# Patient Record
Sex: MALE | Race: OTHER | HISPANIC OR LATINO | URBAN - METROPOLITAN AREA
[De-identification: names, ages, dates, MRNs, and addresses within clinical notes are randomized per-mention and may not be internally consistent; named-entity substitution may affect disease eponyms.]

---

## 2022-11-29 ENCOUNTER — EMERGENCY (EMERGENCY)
Facility: HOSPITAL | Age: 37
LOS: 1 days | Discharge: HOME | End: 2022-11-29
Admitting: EMERGENCY MEDICINE

## 2022-11-29 VITALS
HEART RATE: 84 BPM | HEIGHT: 66 IN | DIASTOLIC BLOOD PRESSURE: 95 MMHG | OXYGEN SATURATION: 98 % | WEIGHT: 229.94 LBS | SYSTOLIC BLOOD PRESSURE: 149 MMHG | RESPIRATION RATE: 18 BRPM | TEMPERATURE: 98 F

## 2022-11-29 VITALS
RESPIRATION RATE: 17 BRPM | DIASTOLIC BLOOD PRESSURE: 78 MMHG | SYSTOLIC BLOOD PRESSURE: 135 MMHG | OXYGEN SATURATION: 98 % | HEART RATE: 83 BPM

## 2022-11-29 PROCEDURE — 99283 EMERGENCY DEPT VISIT LOW MDM: CPT

## 2022-11-29 PROCEDURE — 73130 X-RAY EXAM OF HAND: CPT | Mod: 26,LT

## 2022-11-29 RX ORDER — TETANUS TOXOID, REDUCED DIPHTHERIA TOXOID AND ACELLULAR PERTUSSIS VACCINE, ADSORBED 5; 2.5; 8; 8; 2.5 [IU]/.5ML; [IU]/.5ML; UG/.5ML; UG/.5ML; UG/.5ML
0.5 SUSPENSION INTRAMUSCULAR ONCE
Refills: 0 | Status: COMPLETED | OUTPATIENT
Start: 2022-11-29 | End: 2022-11-29

## 2022-11-29 RX ORDER — IBUPROFEN 200 MG
1 TABLET ORAL
Qty: 28 | Refills: 0
Start: 2022-11-29 | End: 2022-12-05

## 2022-11-29 RX ORDER — MORPHINE SULFATE 50 MG/1
4 CAPSULE, EXTENDED RELEASE ORAL ONCE
Refills: 0 | Status: DISCONTINUED | OUTPATIENT
Start: 2022-11-29 | End: 2022-11-29

## 2022-11-29 RX ORDER — OXYCODONE AND ACETAMINOPHEN 5; 325 MG/1; MG/1
1 TABLET ORAL
Qty: 12 | Refills: 0
Start: 2022-11-29 | End: 2022-12-01

## 2022-11-29 RX ORDER — CEFAZOLIN SODIUM 1 G
1000 VIAL (EA) INJECTION ONCE
Refills: 0 | Status: COMPLETED | OUTPATIENT
Start: 2022-11-29 | End: 2022-11-29

## 2022-11-29 RX ADMIN — TETANUS TOXOID, REDUCED DIPHTHERIA TOXOID AND ACELLULAR PERTUSSIS VACCINE, ADSORBED 0.5 MILLILITER(S): 5; 2.5; 8; 8; 2.5 SUSPENSION INTRAMUSCULAR at 14:48

## 2022-11-29 RX ADMIN — Medication 100 MILLIGRAM(S): at 14:44

## 2022-11-29 RX ADMIN — MORPHINE SULFATE 4 MILLIGRAM(S): 50 CAPSULE, EXTENDED RELEASE ORAL at 14:44

## 2022-11-29 NOTE — ED PROVIDER NOTE - CARE PROVIDER_API CALL
Ulices Muir)  Plastic Surgery  [address], [address]  [place] [address]  Phone: [phone]  Fax: [phone]  Follow Up Time:

## 2022-11-29 NOTE — ED PROVIDER NOTE - OBJECTIVE STATEMENT
38 yo male c/o left hand laceration sustained a few hours ago at work -- works in construction. was using electric saw, it slipped and it cut volar aspect of left 2nd, 3rd, 4th and 5th digits. unknown last tetanus.

## 2022-11-29 NOTE — ED PROVIDER NOTE - PATIENT PORTAL LINK FT
You can access the FollowMyHealth Patient Portal offered by [facility] by registering at the following website: [email or web address]. By joining [employer]’s FollowMyHealth portal, you will also be able to view your health information using other applications (apps) compatible with our system.

## 2022-11-29 NOTE — ED ADULT NURSE NOTE - CHIEF COMPLAINT QUOTE
Pt walked in c/o of laceration to the left 2nd, 3rd, 4th, and 5th fingers from an electric saw at work. Wound dressed in triage. Tdap not utd

## 2022-11-29 NOTE — ED PROVIDER NOTE - PROGRESS NOTE DETAILS
xrays reviewed, left 3rd digit fracture. discussed with Dr. Sher hand on call, Dr. Muir came in for lac repair and further management. d/c home with po antibiotics, pain meds, f/u next week with Dr. Muir. patient agrees with plan, all questions answered.

## 2022-11-29 NOTE — ED ADULT TRIAGE NOTE - CHIEF COMPLAINT QUOTE
Pt walked in c/o of laceration to left 2nd, 3rd, 4th, and 5th fingers from an electric saw at work. Wound dressed in triage. Tdap not utd Pt walked in c/o of laceration to the left 2nd, 3rd, 4th, and 5th fingers from an electric saw at work. Wound dressed in triage. Tdap not utd

## 2022-11-29 NOTE — ED PROVIDER NOTE - NSFOLLOWUPINSTRUCTIONS_ED_ALL_ED_FT
Please take antibiotics as prescribed  Pain medications as needed.     Follow up with Dr. Muir on Tuesday    Keep wounds clean and dry    Return for any concerning or worsening symptoms.

## 2022-11-29 NOTE — ED PROVIDER NOTE - CLINICAL SUMMARY MEDICAL DECISION MAKING FREE TEXT BOX
left 2nd/3rd/4th and 5th digit lacerations from electric saw at work, will update tetanus, xrays, analgesia, IV antibiotic, hand consult.

## 2022-11-29 NOTE — ED PROVIDER NOTE - CARE PLAN
Principal Discharge DX:	Laceration of finger of left hand   1 Principal Discharge DX:	Laceration of finger of left hand  Secondary Diagnosis:	Fracture of finger

## 2022-11-29 NOTE — ED ADULT NURSE NOTE - OBJECTIVE STATEMENT
Pt aox3 with steady gait. Pt walked in c/o of laceration to the left 2nd, 3rd, 4th, and 5th fingers from an electric saw at work. pt able to move fingers but bleeding continues. Pulses positive.

## 2022-12-01 RX ORDER — OXYCODONE AND ACETAMINOPHEN 5; 325 MG/1; MG/1
2 TABLET ORAL
Qty: 24 | Refills: 0
Start: 2022-12-01

## 2022-12-01 RX ORDER — OXYCODONE AND ACETAMINOPHEN 5; 325 MG/1; MG/1
1 TABLET ORAL
Qty: 24 | Refills: 0
Start: 2022-12-01

## 2022-12-02 DIAGNOSIS — Z23 ENCOUNTER FOR IMMUNIZATION: ICD-10-CM

## 2022-12-02 DIAGNOSIS — S61.215A LACERATION WITHOUT FOREIGN BODY OF LEFT RING FINGER WITHOUT DAMAGE TO NAIL, INITIAL ENCOUNTER: ICD-10-CM

## 2022-12-02 DIAGNOSIS — S61.213A LACERATION WITHOUT FOREIGN BODY OF LEFT MIDDLE FINGER WITHOUT DAMAGE TO NAIL, INITIAL ENCOUNTER: ICD-10-CM
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
## 2022-12-02 DIAGNOSIS — S61.211A LACERATION WITHOUT FOREIGN BODY OF LEFT INDEX FINGER WITHOUT DAMAGE TO NAIL, INITIAL ENCOUNTER: ICD-10-CM
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
## 2022-12-02 DIAGNOSIS — S61.217A LACERATION WITHOUT FOREIGN BODY OF LEFT LITTLE FINGER WITHOUT DAMAGE TO NAIL, INITIAL ENCOUNTER: ICD-10-CM

## 2022-12-02 DIAGNOSIS — S62.623A DISPLACED FRACTURE OF MIDDLE PHALANX OF LEFT MIDDLE FINGER, INITIAL ENCOUNTER FOR CLOSED FRACTURE: ICD-10-CM
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
## 2024-03-08 NOTE — ED ADULT TRIAGE NOTE - PAIN RATING/NUMBER SCALE (0-10): ACTIVITY
Jacklyn MELISSA Radha Patient Age: 64 year old  MESSAGE:   Patient calling in and states she is due for a colonoscopy. No symptoms. Please advise      WEIGHT AND HEIGHT:   Wt Readings from Last 1 Encounters:   07/09/21 68.9 kg (152 lb)     Ht Readings from Last 1 Encounters:   07/09/21 5' 7\" (1.702 m)     BMI Readings from Last 1 Encounters:   07/09/21 23.81 kg/m²       ALLERGIES:  Patient has no known allergies.  No current outpatient medications on file.     No current facility-administered medications for this visit.     PHARMACY to use:           Pharmacy preference(s) on file:   [id number] [place], [place] - [address]  [address] [facility] [address]  Phone: [phone] Fax: [phone]      CALL BACK INFO: Ok to leave response (including medical information) with family member or on answering machine  ROUTING: Patient's physician/staff        PCP: Elaine Yin MD         INS: No billing information found for this encounter.   PATIENT ADDRESS:  [address] [place] [id number]   8